# Patient Record
Sex: FEMALE
[De-identification: names, ages, dates, MRNs, and addresses within clinical notes are randomized per-mention and may not be internally consistent; named-entity substitution may affect disease eponyms.]

---

## 2019-11-26 ENCOUNTER — LABORATORY RESULT (OUTPATIENT)
Age: 64
End: 2019-11-26

## 2019-11-26 ENCOUNTER — APPOINTMENT (OUTPATIENT)
Dept: PULMONOLOGY | Facility: CLINIC | Age: 64
End: 2019-11-26
Payer: COMMERCIAL

## 2019-11-26 VITALS
WEIGHT: 133 LBS | HEIGHT: 61 IN | RESPIRATION RATE: 16 BRPM | HEART RATE: 89 BPM | SYSTOLIC BLOOD PRESSURE: 108 MMHG | BODY MASS INDEX: 25.11 KG/M2 | DIASTOLIC BLOOD PRESSURE: 60 MMHG | OXYGEN SATURATION: 99 %

## 2019-11-26 PROCEDURE — 94727 GAS DIL/WSHOT DETER LNG VOL: CPT

## 2019-11-26 PROCEDURE — 94060 EVALUATION OF WHEEZING: CPT

## 2019-11-26 PROCEDURE — 99203 OFFICE O/P NEW LOW 30 MIN: CPT | Mod: 25

## 2019-11-26 PROCEDURE — 94729 DIFFUSING CAPACITY: CPT

## 2019-11-26 RX ORDER — SODIUM CHLORIDE 2.65%
2.65 AEROSOL, SPRAY (ML) NASAL TWICE DAILY
Qty: 1 | Refills: 2 | Status: ACTIVE | COMMUNITY
Start: 2019-11-26 | End: 1900-01-01

## 2019-11-26 NOTE — HISTORY OF PRESENT ILLNESS
[FreeTextEntry1] : 64 year old woman with chronic asthma that she notes has become worse.  She has had asthma for many years, as well as allergies and sinusitis.  She has had sinus drainage many years ago. She has sinus infections once or twice per year.  She is sensitive to irritants and pollution. She has never smoked.  She reports postnasal drip at night and chronic rhinitis.  She has been using Flovent inhaler, albuterol, fluticasone nasal spray. \par \par She has arthritis and has had carpal tunnel syndrome and tendonitis.\par \par PSH\par \par 1959: tonsillectomy\par \par 2013 carpal tunnel, trigger release\par 1996 partial hysterectomy\par \par 1991: sinus surgery. sinus drained. \par \par Family History:\par \par There is no family history of cancer, chronic lung disease.  There is history of heart disease\par \par \par PMH:\par \par HLD, improved\par \par sinus infections.  Receives antibiotics once or twice per year\par \par \par \par SH\par \par \par never smoker\par \par ETOH  occasional\par \par she has 1 dog\par \par no carpets\par \par mold damage  underwent abatement.\par \par Allergy\par Sulfa, penicillin, iodine, Valium, NSAID\par \par seasonal allergy\par \par \par ROS\par \par some reflux symptoms, \par \par sleep disturbance, no daytime somnolence

## 2019-11-26 NOTE — PHYSICAL EXAM
[General Appearance - Well Developed] : well developed [General Appearance - Well Nourished] : well nourished [General Appearance - In No Acute Distress] : no acute distress [Normal Oropharynx] : normal oropharynx [Neck Appearance] : the appearance of the neck was normal [Neck Cervical Mass (___cm)] : no neck mass was observed [Heart Rate And Rhythm] : heart rate and rhythm were normal [Jugular Venous Distention Increased] : there was no jugular-venous distention [Murmurs] : no murmurs present [Heart Sounds] : normal S1 and S2 [Arterial Pulses Normal] : the arterial pulses were normal [Respiration, Rhythm And Depth] : normal respiratory rhythm and effort [Exaggerated Use Of Accessory Muscles For Inspiration] : no accessory muscle use [Bowel Sounds] : normal bowel sounds [Abdomen Soft] : soft [Abdomen Tenderness] : non-tender [] : no hepato-splenomegaly [Nail Clubbing] : no clubbing of the fingernails [Oriented To Time, Place, And Person] : oriented to person, place, and time [Mood] : the mood was normal [Motor Exam] : the motor exam was normal [Low Lying Soft Palate] : no low lying soft palate [Elongated Uvula] : no elongated uvula [Enlarged Base of the Tongue] : no enlargement of the base of the tongue [FreeTextEntry1] : moderate coarseness, mid zone, no wheeze [FreeTextEntry2] : no edema

## 2019-11-26 NOTE — REVIEW OF SYSTEMS
[As Noted in HPI] : as noted in HPI [Nasal Congestion] : nasal congestion [Sinus Problems] : sinus problems [Cough] : cough [Sputum] : sputum  [Dyspnea] : dyspnea [Chest Tightness] : chest tightness [Wheezing] : wheezing [Hay Fever] : hay fever [Heartburn] : heartburn [Fever] : no fever [Chills] : no chills [Hypertension] : no ~T hypertension [Dysrhythmia] : no dysrhythmia [Reflux] : no reflux

## 2020-01-07 ENCOUNTER — RX RENEWAL (OUTPATIENT)
Age: 65
End: 2020-01-07

## 2020-01-21 ENCOUNTER — APPOINTMENT (OUTPATIENT)
Dept: PULMONOLOGY | Facility: CLINIC | Age: 65
End: 2020-01-21

## 2020-03-01 LAB
25(OH)D3 SERPL-MCNC: 30.3 NG/ML
A ALTERNATA IGE QN: 1.33 KUA/L
A FUMIGATUS IGE QN: 0.11 KUA/L
ALBUMIN SERPL ELPH-MCNC: 4.6 G/DL
ALP BLD-CCNC: 81 U/L
ALT SERPL-CCNC: 13 U/L
ANION GAP SERPL CALC-SCNC: 13 MMOL/L
AST SERPL-CCNC: 19 U/L
BASOPHILS # BLD AUTO: 0.03 K/UL
BASOPHILS NFR BLD AUTO: 0.4 %
BERMUDA GRASS IGE QN: <0.1 KUA/L
BILIRUB SERPL-MCNC: 0.2 MG/DL
BOXELDER IGE QN: <0.1 KUA/L
BUN SERPL-MCNC: 20 MG/DL
C HERBARUM IGE QN: <0.1 KUA/L
CALCIUM SERPL-MCNC: 10.3 MG/DL
CALIF WALNUT IGE QN: <0.1 KUA/L
CAT DANDER IGE QN: 0.44 KUA/L
CHLORIDE SERPL-SCNC: 103 MMOL/L
CHOLEST SERPL-MCNC: 214 MG/DL
CHOLEST/HDLC SERPL: 2.8 RATIO
CMN PIGWEED IGE QN: <0.1 KUA/L
CO2 SERPL-SCNC: 26 MMOL/L
COMMON RAGWEED IGE QN: 0.41 KUA/L
COTTONWOOD IGE QN: <0.1 KUA/L
CREAT SERPL-MCNC: 0.67 MG/DL
D FARINAE IGE QN: <0.1 KUA/L
D PTERONYSS IGE QN: <0.1 KUA/L
DEPRECATED A ALTERNATA IGE RAST QL: 2
DEPRECATED A FUMIGATUS IGE RAST QL: NORMAL
DEPRECATED BERMUDA GRASS IGE RAST QL: 0
DEPRECATED BOXELDER IGE RAST QL: 0
DEPRECATED C HERBARUM IGE RAST QL: 0
DEPRECATED CAT DANDER IGE RAST QL: 1
DEPRECATED COMMON PIGWEED IGE RAST QL: 0
DEPRECATED COMMON RAGWEED IGE RAST QL: 1
DEPRECATED COTTONWOOD IGE RAST QL: 0
DEPRECATED D FARINAE IGE RAST QL: 0
DEPRECATED D PTERONYSS IGE RAST QL: 0
DEPRECATED DOG DANDER IGE RAST QL: NORMAL
DEPRECATED GOOSEFOOT IGE RAST QL: 0
DEPRECATED LONDON PLANE IGE RAST QL: 0
DEPRECATED MUGWORT IGE RAST QL: 1
DEPRECATED P NOTATUM IGE RAST QL: 0
DEPRECATED RED CEDAR IGE RAST QL: 0
DEPRECATED ROACH IGE RAST QL: 0
DEPRECATED SHEEP SORREL IGE RAST QL: 0
DEPRECATED SILVER BIRCH IGE RAST QL: 0
DEPRECATED TIMOTHY IGE RAST QL: NORMAL
DEPRECATED WHITE ASH IGE RAST QL: 0
DEPRECATED WHITE OAK IGE RAST QL: NORMAL
DOG DANDER IGE QN: 0.11 KUA/L
EOSINOPHIL # BLD AUTO: 0.04 K/UL
EOSINOPHIL NFR BLD AUTO: 0.6 %
FERRITIN SERPL-MCNC: 137 NG/ML
GLUCOSE SERPL-MCNC: 91 MG/DL
GOOSEFOOT IGE QN: <0.1 KUA/L
HCT VFR BLD CALC: 38.3 %
HDLC SERPL-MCNC: 76 MG/DL
HGB BLD-MCNC: 12.2 G/DL
IMM GRANULOCYTES NFR BLD AUTO: 0.3 %
IRON SATN MFR SERPL: 28 %
IRON SERPL-MCNC: 85 UG/DL
LDLC SERPL CALC-MCNC: 124 MG/DL
LONDON PLANE IGE QN: <0.1 KUA/L
LYMPHOCYTES # BLD AUTO: 1.9 K/UL
LYMPHOCYTES NFR BLD AUTO: 28.5 %
MAN DIFF?: NORMAL
MCHC RBC-ENTMCNC: 29.9 PG
MCHC RBC-ENTMCNC: 31.9 GM/DL
MCV RBC AUTO: 93.9 FL
MONOCYTES # BLD AUTO: 0.61 K/UL
MONOCYTES NFR BLD AUTO: 9.1 %
MUGWORT IGE QN: 0.45 KUA/L
MULBERRY (T70) CLASS: 0
MULBERRY (T70) CONC: <0.1 KUA/L
NEUTROPHILS # BLD AUTO: 4.07 K/UL
NEUTROPHILS NFR BLD AUTO: 61.1 %
P NOTATUM IGE QN: <0.1 KUA/L
PLATELET # BLD AUTO: 295 K/UL
POTASSIUM SERPL-SCNC: 4.4 MMOL/L
PROT SERPL-MCNC: 6.7 G/DL
RBC # BLD: 4.08 M/UL
RBC # FLD: 12.3 %
RED CEDAR IGE QN: <0.1 KUA/L
ROACH IGE QN: <0.1 KUA/L
SHEEP SORREL IGE QN: <0.1 KUA/L
SILVER BIRCH IGE QN: <0.1 KUA/L
SODIUM SERPL-SCNC: 142 MMOL/L
TIBC SERPL-MCNC: 305 UG/DL
TIMOTHY IGE QN: 0.2 KUA/L
TOTAL IGE SMQN RAST: 39 KU/L
TREE ALLERG MIX1 IGE QL: 0
TRIGL SERPL-MCNC: 72 MG/DL
TSH SERPL-ACNC: 1.88 UIU/ML
UIBC SERPL-MCNC: 220 UG/DL
VIT B12 SERPL-MCNC: 565 PG/ML
WBC # FLD AUTO: 6.67 K/UL
WHITE ASH IGE QN: <0.1 KUA/L
WHITE ELM IGE QN: 0
WHITE ELM IGE QN: <0.1 KUA/L
WHITE OAK IGE QN: 0.19 KUA/L

## 2020-03-17 ENCOUNTER — APPOINTMENT (OUTPATIENT)
Dept: PULMONOLOGY | Facility: CLINIC | Age: 65
End: 2020-03-17
Payer: MEDICARE

## 2020-03-17 VITALS
SYSTOLIC BLOOD PRESSURE: 122 MMHG | TEMPERATURE: 97.9 F | DIASTOLIC BLOOD PRESSURE: 80 MMHG | WEIGHT: 134 LBS | OXYGEN SATURATION: 97 % | HEART RATE: 80 BPM | BODY MASS INDEX: 25.32 KG/M2 | RESPIRATION RATE: 17 BRPM

## 2020-03-17 PROCEDURE — 99214 OFFICE O/P EST MOD 30 MIN: CPT

## 2020-03-17 NOTE — HISTORY OF PRESENT ILLNESS
[TextBox_4] : 64 year old woman with chronic asthma that she notes has become worse. She has had asthma for many years, as well as allergies and sinusitis. She has had sinus drainage many years ago. She has sinus infections once or twice per year. She is sensitive to irritants and pollution. She has never smoked. She reports postnasal drip at night and chronic rhinitis. She has been using Flovent inhaler, albuterol, fluticasone nasal spray. \par \par She states she still has mucus in the nose that is worse at night. She is using saline and steroid nasal spray. Asthma has improved on Symbicort which she stopped 1 month ago.\par \par \par She has arthritis and has had carpal tunnel syndrome and tendonitis.\par \par PSH\par \par 1959: tonsillectomy\par \par 2013 carpal tunnel, trigger release\par 1996 partial hysterectomy\par \par 1991: sinus surgery. sinus drained. \par \par Family History:\par \par There is no family history of cancer, chronic lung disease. There is history of heart disease\par \par \par PMH:\par \par HLD, improved\par \par sinus infections. Receives antibiotics once or twice per year\par \par \par \par SH\par \par \par never smoker\par \par ETOH occasional\par \par she has 1 dog\par \par no carpets\par \par mold damage underwent abatement.\par \par Allergy\par Sulfa, penicillin, iodine, Valium, NSAID\par \par seasonal allergy\par \par \par ROS\par \par some reflux symptoms, \par \par sleep disturbance, no daytime somnolence \par

## 2020-03-17 NOTE — DISCUSSION/SUMMARY
[FreeTextEntry1] : chronic asthma improved \par \par seasonal allergy\par \par PLAN\par \par will likely need to restart Symbicort for allergy season\par \par continue Flonase and nasal saline, albuterol as needed\par \par mucinex as needed\par \par continue Allegra\par \par She has had the influenza vaccine this season. \par \par Bao Bermudez MD [facility] \par

## 2020-03-17 NOTE — PHYSICAL EXAM
[No Acute Distress] : no acute distress [Normal Oropharynx] : normal oropharynx [I] : Mallampati Class: I [Normal Appearance] : normal appearance [No Neck Mass] : no neck mass [No JVD] : no jvd [Normal Rate/Rhythm] : normal rate/rhythm [Normal S1, S2] : normal s1, s2 [No Murmurs] : no murmurs [No Resp Distress] : no resp distress [Clear to Auscultation Bilaterally] : clear to auscultation bilaterally [No Abnormalities] : no abnormalities [Benign] : benign [No HSM] : no hsm [No Clubbing] : no clubbing [No Edema] : no edema [No Focal Deficits] : no focal deficits [Oriented x3] : oriented x3 [Normal Affect] : normal affect

## 2020-06-30 ENCOUNTER — APPOINTMENT (OUTPATIENT)
Dept: PULMONOLOGY | Facility: CLINIC | Age: 65
End: 2020-06-30
Payer: MEDICARE

## 2020-06-30 VITALS
RESPIRATION RATE: 17 BRPM | TEMPERATURE: 98.7 F | HEART RATE: 80 BPM | SYSTOLIC BLOOD PRESSURE: 120 MMHG | OXYGEN SATURATION: 98 % | BODY MASS INDEX: 25.7 KG/M2 | DIASTOLIC BLOOD PRESSURE: 80 MMHG | WEIGHT: 136 LBS

## 2020-06-30 PROCEDURE — 99214 OFFICE O/P EST MOD 30 MIN: CPT

## 2020-06-30 RX ORDER — BUDESONIDE 32 UG/1
32 SPRAY, METERED NASAL DAILY
Qty: 3 | Refills: 2 | Status: DISCONTINUED | COMMUNITY
Start: 2020-03-17 | End: 2020-06-30

## 2020-06-30 NOTE — REVIEW OF SYSTEMS
[Nasal Congestion] : nasal congestion [Cough] : cough [Hay Fever] : hay fever [GERD] : gerd [Fever] : no fever [Sputum] : no sputum [Dyspnea] : no dyspnea [Edema] : no edema [TextBox_69] : better with avoidance

## 2020-06-30 NOTE — PHYSICAL EXAM
[No Acute Distress] : no acute distress [Normal Appearance] : normal appearance [No Neck Mass] : no neck mass [No JVD] : no jvd [Normal Rate/Rhythm] : normal rate/rhythm [Normal S1, S2] : normal s1, s2 [No Murmurs] : no murmurs [No Resp Distress] : no resp distress [Clear to Auscultation Bilaterally] : clear to auscultation bilaterally [No Abnormalities] : no abnormalities [Benign] : benign [No HSM] : no hsm [No Clubbing] : no clubbing [No Edema] : no edema [No Focal Deficits] : no focal deficits [Oriented x3] : oriented x3 [Normal Affect] : normal affect

## 2020-06-30 NOTE — HISTORY OF PRESENT ILLNESS
[TextBox_4] : 64 year old woman with chronic asthma. She has had asthma for many years, as well as allergies and sinusitis. She has had sinus drainage many years ago. She has sinus infections once or twice per year. She is sensitive to irritants and pollution. She has never smoked. She has had postnasal drip at night and chronic rhinitis. \par \par Recently, she has been using an herbal supplement that helps her sinus congestion.\par \par \par \par Asthma has been in good control.\par \par She is no longer using inhaled steroid. She has been using only albuterol.  She is currently not using fluticasone nasal spray. \par \par \par She has arthritis and has had carpal tunnel syndrome and tendonitis.\par \par PSH\par \par 1959: tonsillectomy\par \par 2013 carpal tunnel, trigger release\par 1996 partial hysterectomy\par \par 1991: sinus surgery. sinus drained. \par \par Family History:\par \par There is no family history of cancer, chronic lung disease. There is history of heart disease\par \par \par PMH:\par \par HLD, improved\par \par sinus infections. Receives antibiotics once or twice per year\par \par \par \par SH\par \par \par never smoker\par \par ETOH occasional\par \par she has 1 dog\par \par no carpets\par \par mold damage underwent abatement.\par \par Allergy\par Sulfa, penicillin, iodine, Valium, NSAID\par \par seasonal allergy\par \par \par ROS\par \par some reflux symptoms, \par \par sleep disturbance, no daytime somnolence \par \par SLEEP\par No snoring, witnessed apnea, excessive daytime sleepiness, morning headache \par

## 2020-06-30 NOTE — DISCUSSION/SUMMARY
[FreeTextEntry1] : chronic asthma improved now using only albuterol rescue and herbal remedy.  No dyspnea\par \par seasonal allergy better controlled.\par \par PLAN\par \par Hold ICS/LABA\par \par continue to use albuterol as needed\par \par mucinex as needed\par \par continue Allegra only as needed\par \par She is very happy using herbal supplement that seems to clear sinus symptoms\par \par She has had the influenza vaccine this season. \par \par SLEEP\par No snoring, witnessed apnea, excessive daytime sleepiness, morning headache \par \par Bao Bermudez MD [facility] \par

## 2020-07-09 ENCOUNTER — RESULT REVIEW (OUTPATIENT)
Age: 65
End: 2020-07-09

## 2020-08-07 LAB
25(OH)D3 SERPL-MCNC: 27.3 NG/ML
ALBUMIN SERPL ELPH-MCNC: 5 G/DL
ALP BLD-CCNC: 86 U/L
ALT SERPL-CCNC: 12 U/L
ANION GAP SERPL CALC-SCNC: 15 MMOL/L
AST SERPL-CCNC: 17 U/L
BASOPHILS # BLD AUTO: 0.02 K/UL
BASOPHILS NFR BLD AUTO: 0.3 %
BILIRUB SERPL-MCNC: 0.5 MG/DL
BUN SERPL-MCNC: 16 MG/DL
CALCIUM SERPL-MCNC: 9.8 MG/DL
CHLORIDE SERPL-SCNC: 102 MMOL/L
CHOLEST SERPL-MCNC: 231 MG/DL
CHOLEST/HDLC SERPL: 3.8 RATIO
CO2 SERPL-SCNC: 23 MMOL/L
CREAT SERPL-MCNC: 0.73 MG/DL
EOSINOPHIL # BLD AUTO: 0.08 K/UL
EOSINOPHIL NFR BLD AUTO: 1.3 %
GLUCOSE SERPL-MCNC: 95 MG/DL
HCT VFR BLD CALC: 40.9 %
HDLC SERPL-MCNC: 61 MG/DL
HGB BLD-MCNC: 12.5 G/DL
IMM GRANULOCYTES NFR BLD AUTO: 0.7 %
LDLC SERPL CALC-MCNC: 148 MG/DL
LYMPHOCYTES # BLD AUTO: 1.16 K/UL
LYMPHOCYTES NFR BLD AUTO: 19.3 %
MAN DIFF?: NORMAL
MCHC RBC-ENTMCNC: 29.9 PG
MCHC RBC-ENTMCNC: 30.6 GM/DL
MCV RBC AUTO: 97.8 FL
MONOCYTES # BLD AUTO: 0.58 K/UL
MONOCYTES NFR BLD AUTO: 9.7 %
NEUTROPHILS # BLD AUTO: 4.13 K/UL
NEUTROPHILS NFR BLD AUTO: 68.7 %
PLATELET # BLD AUTO: 288 K/UL
POTASSIUM SERPL-SCNC: 4 MMOL/L
PROT SERPL-MCNC: 7.2 G/DL
RBC # BLD: 4.18 M/UL
RBC # FLD: 12.8 %
SARS-COV-2 IGG SERPL IA-ACNC: 0.01 INDEX
SARS-COV-2 IGG SERPL QL IA: NEGATIVE
SODIUM SERPL-SCNC: 140 MMOL/L
TRIGL SERPL-MCNC: 109 MG/DL
WBC # FLD AUTO: 6.01 K/UL

## 2020-12-01 ENCOUNTER — APPOINTMENT (OUTPATIENT)
Dept: PULMONOLOGY | Facility: CLINIC | Age: 65
End: 2020-12-01
Payer: MEDICARE

## 2020-12-01 VITALS
SYSTOLIC BLOOD PRESSURE: 122 MMHG | WEIGHT: 128 LBS | HEART RATE: 85 BPM | OXYGEN SATURATION: 96 % | BODY MASS INDEX: 24.19 KG/M2 | TEMPERATURE: 97 F | RESPIRATION RATE: 17 BRPM | DIASTOLIC BLOOD PRESSURE: 80 MMHG

## 2020-12-01 PROCEDURE — 99214 OFFICE O/P EST MOD 30 MIN: CPT

## 2020-12-01 RX ORDER — AZELASTINE HYDROCHLORIDE 137 UG/1
137 SPRAY, METERED NASAL TWICE DAILY
Qty: 1 | Refills: 0 | Status: ACTIVE | COMMUNITY
Start: 2020-12-01 | End: 1900-01-01

## 2020-12-01 RX ORDER — BUDESONIDE AND FORMOTEROL FUMARATE DIHYDRATE 160; 4.5 UG/1; UG/1
160-4.5 AEROSOL RESPIRATORY (INHALATION) TWICE DAILY
Qty: 3 | Refills: 1 | Status: DISCONTINUED | COMMUNITY
Start: 2019-11-26 | End: 2020-12-01

## 2020-12-01 RX ORDER — FLUTICASONE PROPIONATE 50 UG/1
50 SPRAY, METERED NASAL
Qty: 1 | Refills: 2 | Status: ACTIVE | COMMUNITY
Start: 2019-11-26

## 2020-12-01 NOTE — HISTORY OF PRESENT ILLNESS
[TextBox_4] : 65 year old woman with chronic asthma. She has had asthma for many years, as well as allergies and sinusitis. She has had sinus drainage many years ago. She has sinus infections once or twice per year. She is sensitive to irritants and pollution. She has never smoked. She has had postnasal drip at night and chronic rhinitis. \par \par She continues to use saline nasal wash.  She has run out of fluticasone nasal spray. \par \par Recently, she has been using an herbal supplement that helps her sinus congestion.\par \par Asthma has been in good control. \par She is no longer using inhaled steroid. She has been using only albuterol.  \par \par \par She has arthritis and has had carpal tunnel syndrome and tendonitis.\par \par She is on vegetarian diet and has lost weight.\par \par PSH\par \par 1959: tonsillectomy\par \par 2013 carpal tunnel, trigger release\par 1996 partial hysterectomy\par \par 1991: sinus surgery. sinus drained. \par \par Family History:\par \par There is no family history of cancer, chronic lung disease. There is history of heart disease\par \par \par PMH:\par \par HLD, improved\par \par sinus infections. Receives antibiotics once or twice per year\par \par \par \par SH\par \par \par never smoker\par \par ETOH occasional\par \par she has 1 dog\par \par no carpets\par \par mold damage underwent abatement.\par \par Allergy\par Sulfa, penicillin, iodine, Valium, NSAID\par \par seasonal allergy\par \par \par ROS\par \par some reflux symptoms, \par \par sleep disturbance, no daytime somnolence \par \par SLEEP\par No snoring, witnessed apnea, excessive daytime sleepiness, morning headache \par  [TextBox_19] : SLEEP\par No snoring, witnessed apnea, excessive daytime sleepiness, morning headache

## 2020-12-01 NOTE — DISCUSSION/SUMMARY
[FreeTextEntry1] : chronic asthma improved now using only albuterol rescue and herbal remedy.  No dyspnea\par \par seasonal allergy better controlled.\par \par rhinitis\par \par PLAN\par \par use albuterol MDI as needed\par \par restart Flonase\par trial azelastine\par \par mucinex as needed\par \par refer to ENT\par \par She has had the influenza vaccine this season. \par \par \par Bao Bermudez MD [facility] \par

## 2021-04-08 ENCOUNTER — APPOINTMENT (OUTPATIENT)
Dept: OTOLARYNGOLOGY | Facility: CLINIC | Age: 66
End: 2021-04-08

## 2021-06-02 ENCOUNTER — APPOINTMENT (OUTPATIENT)
Dept: PULMONOLOGY | Facility: CLINIC | Age: 66
End: 2021-06-02
Payer: MEDICARE

## 2021-06-02 VITALS
WEIGHT: 132 LBS | HEART RATE: 70 BPM | OXYGEN SATURATION: 98 % | DIASTOLIC BLOOD PRESSURE: 80 MMHG | TEMPERATURE: 98 F | HEIGHT: 61 IN | BODY MASS INDEX: 24.92 KG/M2 | SYSTOLIC BLOOD PRESSURE: 128 MMHG

## 2021-06-02 DIAGNOSIS — J30.9 ALLERGIC RHINITIS, UNSPECIFIED: ICD-10-CM

## 2021-06-02 PROCEDURE — 99213 OFFICE O/P EST LOW 20 MIN: CPT

## 2021-06-02 RX ORDER — BUDESONIDE AND FORMOTEROL FUMARATE DIHYDRATE 160; 4.5 UG/1; UG/1
160-4.5 AEROSOL RESPIRATORY (INHALATION) TWICE DAILY
Qty: 3 | Refills: 1 | Status: ACTIVE | COMMUNITY
Start: 2021-06-02 | End: 1900-01-01

## 2021-06-02 RX ORDER — FEXOFENADINE HYDROCHLORIDE 180 MG/1
180 TABLET ORAL
Qty: 90 | Refills: 2 | Status: DISCONTINUED | OUTPATIENT
Start: 2021-06-02 | End: 2021-06-02

## 2021-06-02 RX ORDER — ALBUTEROL SULFATE 90 UG/1
108 (90 BASE) AEROSOL, METERED RESPIRATORY (INHALATION)
Qty: 2 | Refills: 3 | Status: ACTIVE | COMMUNITY
Start: 2019-11-26 | End: 1900-01-01

## 2021-06-02 RX ORDER — FEXOFENADINE HYDROCHLORIDE 180 MG/1
180 TABLET, FILM COATED ORAL
Qty: 30 | Refills: 2 | Status: ACTIVE | OUTPATIENT
Start: 2021-06-02

## 2021-06-02 RX ORDER — FEXOFENADINE HYDROCHLORIDE 180 MG/1
180 TABLET ORAL DAILY
Qty: 30 | Refills: 2 | Status: DISCONTINUED | COMMUNITY
Start: 2019-11-26 | End: 2021-06-02

## 2021-06-03 PROBLEM — J30.9 ALLERGIC RHINITIS: Status: ACTIVE | Noted: 2020-12-01

## 2021-06-03 NOTE — PHYSICAL EXAM
[No Acute Distress] : no acute distress [Normal Appearance] : normal appearance [No Neck Mass] : no neck mass [No JVD] : no jvd [Normal Rate/Rhythm] : normal rate/rhythm [Normal S1, S2] : normal s1, s2 [No Murmurs] : no murmurs [No Resp Distress] : no resp distress [Clear to Auscultation Bilaterally] : clear to auscultation bilaterally [No Abnormalities] : no abnormalities [Benign] : benign [No HSM] : no hsm [No Clubbing] : no clubbing [No Edema] : no edema [No Focal Deficits] : no focal deficits [Normal Affect] : normal affect [Oriented x3] : oriented x3 [Well Nourished] : well nourished [Well Developed] : well developed

## 2021-06-03 NOTE — DISCUSSION/SUMMARY
[FreeTextEntry1] : chronic asthma now using only albuterol rescue and herbal remedy.  No dyspnea\par \par seasonal allergy\par \par Now with increased symptoms during allergy season\par \par She has not been on controller medications for asthma\par \par rhinitis\par \par Postnasal drip and mucus in back of throat.\par \par PLAN\par \par \par restart on Symbicort\par \par use albuterol MDI as needed\par \par restart Flonase 2 sprays at night\par \par Mucinex as needed\par \par trial of fexofenadine 180 mg per day\par \par Pneumonia vaccine to be offered next visit\par \par She has had the influenza vaccine this season. \par \par \par Bao Bermudez MD [facility] \par

## 2021-06-03 NOTE — HISTORY OF PRESENT ILLNESS
[TextBox_4] : 65 year old woman with chronic asthma. She has had asthma for many years, as well as allergies and sinusitis. She has had sinus drainage many years ago. She has sinus infections once or twice per year. She is sensitive to irritants and pollution. She has never smoked. She has had postnasal drip at night and chronic rhinitis. \par \par She continues to use saline nasal wash.  She has run out of fluticasone nasal spray. \par \par Recently, she has been using an herbal supplement that helps her sinus congestion.\par  \par She is no longer using inhaled steroid. She has been using only albuterol.  \par \par \par She has arthritis and has had carpal tunnel syndrome and tendonitis.\par \par She is on vegetarian diet and has lost weight.\par \par Today she reports that her status is worse.\par \par She reports having thick mucus in back of throat.  \par \par She has "sore lungs"  She uses albuterol only.\par \par She does use Flonase and loratadine. \par \par PSH\par \par 1959: tonsillectomy\par \par 2013 carpal tunnel, trigger release\par 1996 partial hysterectomy\par \par 1991: sinus surgery. sinus drained. \par \par Family History:\par \par There is no family history of cancer, chronic lung disease. There is history of heart disease\par \par \par PMH:\par \par HLD, improved\par \par sinus infections. Receives antibiotics once or twice per year\par \par \par \par SH\par \par \par never smoker\par \par ETOH occasional\par \par she has 1 dog\par \par no carpets\par \par mold damage underwent abatement.\par \par Allergy\par Sulfa, penicillin, iodine, Valium, NSAID\par \par seasonal allergy\par \par \par ROS\par \par some reflux symptoms, \par \par sleep disturbance, no daytime somnolence \par \par SLEEP\par No snoring, witnessed apnea, excessive daytime sleepiness, morning headache \par  [TextBox_19] : SLEEP\par No snoring, witnessed apnea, excessive daytime sleepiness, morning headache

## 2021-06-03 NOTE — REVIEW OF SYSTEMS
[Fever] : no fever [Nasal Congestion] : nasal congestion [Cough] : cough [Sputum] : no sputum [Dyspnea] : no dyspnea [Edema] : no edema [Hay Fever] : hay fever [GERD] : gerd [TextBox_69] : better with avoidance

## 2021-06-06 ENCOUNTER — RX RENEWAL (OUTPATIENT)
Age: 66
End: 2021-06-06

## 2021-06-06 RX ORDER — FLUTICASONE PROPIONATE 50 UG/1
50 SPRAY, METERED NASAL
Qty: 1 | Refills: 2 | Status: ACTIVE | COMMUNITY
Start: 2021-06-06 | End: 1900-01-01

## 2021-06-30 ENCOUNTER — APPOINTMENT (OUTPATIENT)
Dept: PULMONOLOGY | Facility: CLINIC | Age: 66
End: 2021-06-30

## 2022-06-13 ENCOUNTER — RX RENEWAL (OUTPATIENT)
Age: 67
End: 2022-06-13

## 2022-06-13 RX ORDER — EPINEPHRINE 0.3 MG/.3ML
0.3 INJECTION INTRAMUSCULAR
Qty: 2 | Refills: 2 | Status: ACTIVE | COMMUNITY
Start: 2021-06-02

## 2022-10-11 ENCOUNTER — NON-APPOINTMENT (OUTPATIENT)
Age: 67
End: 2022-10-11

## 2023-03-27 ENCOUNTER — APPOINTMENT (OUTPATIENT)
Dept: PULMONOLOGY | Facility: CLINIC | Age: 68
End: 2023-03-27
Payer: MEDICARE

## 2023-03-27 VITALS
BODY MASS INDEX: 24.75 KG/M2 | SYSTOLIC BLOOD PRESSURE: 118 MMHG | TEMPERATURE: 98.6 F | WEIGHT: 131 LBS | DIASTOLIC BLOOD PRESSURE: 70 MMHG | OXYGEN SATURATION: 98 % | HEART RATE: 68 BPM

## 2023-03-27 DIAGNOSIS — J45.909 UNSPECIFIED ASTHMA, UNCOMPLICATED: ICD-10-CM

## 2023-03-27 DIAGNOSIS — Z00.00 ENCOUNTER FOR GENERAL ADULT MEDICAL EXAMINATION W/OUT ABNORMAL FINDINGS: ICD-10-CM

## 2023-03-27 DIAGNOSIS — Z23 ENCOUNTER FOR IMMUNIZATION: ICD-10-CM

## 2023-03-27 PROCEDURE — 99214 OFFICE O/P EST MOD 30 MIN: CPT

## 2023-03-27 NOTE — DISCUSSION/SUMMARY
[FreeTextEntry1] : chronic asthma now using only albuterol rescue and herbal remedy.  No dyspnea\par \par seasonal allergy, taking loratadine and fluticasone nasal spray\par She has been breathing well\par \par She has not been on controller medications for asthma for at least 1 year\par \par rhinitis persists\par \par PLAN\par \par She does not need ICS LABA\par \par use albuterol MDI as needed\par \par \par Mucinex as needed\par \par uses loratadine\par \par Pneumonia vaccine obtained Prevnar 20\par \par She has had the influenza vaccine this season. \par \par Total time spent : 30 minutes\par Including:\par Preparation prior to visit - Reviewing prior record, results of tests and Consultation Reports as applicable\par Conducting an appropriate H & P during today's encounter\par Appropriate orders for tests, medications and procedures, as applicable\par Counseling patient \par Note completion \par \par \par Bao Bermudez MD [facility] \par

## 2023-03-27 NOTE — HISTORY OF PRESENT ILLNESS
[Never] : never [TextBox_4] : 67 year old woman with chronic asthma. She has had asthma for many years, as well as allergies and sinusitis. She has had sinus drainage many years ago. She has sinus infections once or twice per year. She is sensitive to irritants and pollution. She has never smoked. She has had postnasal drip at night and chronic rhinitis. \par \par She continues to use saline nasal wash.  She has run out of fluticasone nasal spray. \par \par Recently, she has been using an herbal supplement that helps her sinus congestion.\par  \par She is no longer using inhaled steroid. She has been using only albuterol.  \par \par \par She has arthritis and has had carpal tunnel syndrome and tendonitis.\par \par She is on vegetarian diet and has lost weight.\par \par Today she reports that her status is worse.\par \par She reports having thick mucus in back of throat.  \par \par She has "sore lungs"  She uses albuterol only.\par \par She does use Flonase and loratadine. \par \par \par \par PSH\par \par 1959: tonsillectomy\par \par 2013 carpal tunnel, trigger release\par 1996 partial hysterectomy\par \par 1991: sinus surgery. sinus drained. \par \par Family History:\par \par There is no family history of cancer, chronic lung disease. There is history of heart disease\par \par \par PMH:\par \par HLD, improved\par \par sinus infections. Receives antibiotics once or twice per year\par \par \par \par SH\par \par \par never smoker\par \par ETOH occasional\par \par she has 1 dog\par \par no carpets\par \par mold damage underwent abatement.\par \par Allergy\par Sulfa, penicillin, iodine, Valium, NSAID\par \par seasonal allergy\par \par \par ROS\par \par some reflux symptoms, \par \par sleep disturbance, no daytime somnolence \par \par SLEEP\par No snoring, witnessed apnea, excessive daytime sleepiness, morning headache \par  [TextBox_19] : SLEEP\par No snoring, witnessed apnea, excessive daytime sleepiness, morning headache

## 2023-03-27 NOTE — PHYSICAL EXAM
[No Acute Distress] : no acute distress [Well Nourished] : well nourished [Well Developed] : well developed [Normal Appearance] : normal appearance [No Neck Mass] : no neck mass [No JVD] : no jvd [Normal Rate/Rhythm] : normal rate/rhythm [Normal S1, S2] : normal s1, s2 [No Murmurs] : no murmurs [No Resp Distress] : no resp distress [Clear to Auscultation Bilaterally] : clear to auscultation bilaterally [No Abnormalities] : no abnormalities [Benign] : benign [No HSM] : no hsm [No Clubbing] : no clubbing [No Edema] : no edema [No Focal Deficits] : no focal deficits [Oriented x3] : oriented x3 [Normal Affect] : normal affect

## 2023-05-19 DIAGNOSIS — T78.40XA ALLERGY, UNSPECIFIED, INITIAL ENCOUNTER: ICD-10-CM

## 2023-05-19 RX ORDER — EPINEPHRINE 0.3 MG/.3ML
0.3 INJECTION INTRAMUSCULAR
Qty: 1 | Refills: 2 | Status: ACTIVE | COMMUNITY
Start: 2023-05-19 | End: 1900-01-01

## 2023-05-25 ENCOUNTER — NON-APPOINTMENT (OUTPATIENT)
Age: 68
End: 2023-05-25

## 2024-05-14 ENCOUNTER — NON-APPOINTMENT (OUTPATIENT)
Age: 69
End: 2024-05-14

## 2024-05-21 ENCOUNTER — OUTPATIENT (OUTPATIENT)
Dept: OUTPATIENT SERVICES | Facility: HOSPITAL | Age: 69
LOS: 1 days | Discharge: HOME | End: 2024-05-21

## 2024-05-21 DIAGNOSIS — Z15.09 GENETIC SUSCEPTIBILITY TO OTHER MALIGNANT NEOPLASM: ICD-10-CM

## 2024-05-28 ENCOUNTER — APPOINTMENT (OUTPATIENT)
Dept: HEMATOLOGY ONCOLOGY | Facility: CLINIC | Age: 69
End: 2024-05-28

## 2024-05-28 NOTE — DISCUSSION/SUMMARY
[FreeTextEntry1] : The visit was provided via telehealth using real-time 2-way audio visual technology. The patient, Silvia Sandoval, was located at home, [address] [place] [address], at the time of the visit. The Genetic Counselor, Kassandra Luis, was located remotely in [place], [place]. The patient and the Genetic Counselor both participated in the telehealth encounter. Consent for telehealth services was given on 2024 by the patient, Silvia Sandoval.  REASON FOR CONSULT Silvia Sandoval is a 68-year-old female who was referred by Dr. Radha Chapa for cancer genetic counseling and risk assessment due to her family history of cancer.   RELEVANT MEDICAL HISTORY Ms. Sandoval is a healthy individual who has never had cancer. She has a family history of cancer, see below.  OTHER MEDICAL AND SURGICAL HISTORY: Medical: cervical dysplasia Level 4 at age 41, asthma, GERD (reportedly regulated currently), sinusitis, arthritis Surgical: hysterectomy (reportedly ovaries intact), multiple hand surgeries due to glide release and carpal tunnel, tonsillectomy, sinus surgery  PAST OB/GYN HISTORY: Obstetrical History:  Age at Menarche: 12 Perimenopause at age 39, Menopausal age 53 with LMP with hysterectomy at age 41 Age at First Live Birth: 33.5 Oral Contraceptive Use: Yes, age 16 for 1 month total Hormone Replacement Therapy: None  CANCER SCREENING HISTORY:   Breast:  -	Mammo: 2023; Results: reportedly normal with dense, cystic breast tissue; Frequency: yearly since age 35 -	Sono: None -	MRI: None -	Breast Biopsies: None GYN: -	Pelvic exam: 2024; Frequency: yearly -	Ms. Sandoval reports a personal history of cervical dysplasia Level 4 at age 41 with hysterectomy  -	Pap smear: Ms. Sandoval reports these were performed for a few years after her hysterectomy of the "walls" and reportedly normal -	TVUS: Yes, most recently 5 years ago reportedly due to microhematuria (now resolved) which was normal Colon: -	Colonoscopy: 2024; Results: reportedly 2 small non-cancerous polyps removed, diverticulosis, hemorrhoids; Frequency: 5 per Ms. Sandoval but she states she may proceed more frequently due to her father's history of colon polyps -	Total Polyps: 2 per Ms. Sandoval Skin:   -	FBSE: ; Frequency: yearly, scheduled 2024 -	Lesions biopsied/removed: None  SOCIAL HISTORY: -	Tobacco-product use: Never smoker -	Environmental exposure: [place], part of  registry  FAMILY HISTORY: Maternal ancestry was reported as [place] ([employer]) and paternal ancestry was reported as [place] and [place] ([place]ken[place] [employer]). A detailed family history of cancer was ascertained. Relevant diagnoses are detailed below and in the scanned pedigree.   To Ms. Sandoval's knowledge, no one in the family has had germline testing for cancer susceptibility.    	RISK ASSESSMENT: Ms. Sandoval's family history of breast cancer in her mother (dx 57), maternal aunt (dx late 60s), paternal aunt (mid 60s), and paternal first cousin (dx 70s) in the setting of [place] ancestry is suggestive of an inherited predisposition to breast cancer and related cancers. We also discussed that approximately 1 in 40 individuals with [place] ancestry carry a mutation in the BRCA1/2 genes. We discussed that although Ms. Sandoval meets the National Comprehensive Cancer Network (NCCN) criteria for genetic testing, she does not meet Medicare criteria for testing based on the current reported personal and family history of cancer. She understood she would have to pursue genetic testing using the laboratory's self-pay option of ~$249.   We recommended genetic testing using [facility] BRCANext Panel, a guidelines-based panel of genes associated with breast and gynecologic cancers, with the option to expand testing to include their ColoNext Panel, a panel of genes associated with colorectal polyps and colorectal cancer due to her father's reported history of 20+ precancerous colon polyps but discussed her personal reported history of 2 total benign polyps may be reassuring.   We discussed the risks, benefits and limitations, and implications of genetic testing. We also discussed the psychosocial implications of genetic testing. Possible test results were reviewed with Ms. Sandoval, along with associated medical management options. The Genetic Information Non-discrimination Act (BIJAL) was also reviewed.   Following our discussion, Ms. Sandoval decided to defer genetic testing. She would like to consider the potential implications of genetic testing further prior to pursuing genetic testing. Handout on BIJAL and the laboratory's informed consent form provided to Ms. Sandoval via email for her review. Ms. Sandoval was made aware that she may pursue testing at any time in the future and we remain available to her should she wish to make another appointment. It was explained that risk assessment is based upon medical and family history as provided and may change in the future should new information be obtained.    PLAN:  1.	Patient elected to defer genetic testing. She will contact us in the future if she wishes to proceed with genetic testing. 2.	Copy of [facility] informed consent and handout on BIJAL sent via email for Ms. Sandoval's review. 3.	Copy of medical release form sent via email for Ms. Sandoval to complete and return to allow discussion of her genetics information with her internist, Dr. Anu Velazquez. 4.	Ms. Sandoval was made aware if anything changes in her personal or family history, she should contact us to re-assess her risk and re-evaluate the role of genetic testing.    For any additional questions please call Cancer Genetics at [phone].    Kassandra Luis MS, [facility] Genetic Counselor, Cancer Genetics   CC:  Patient Dr. Radha Chapa

## 2024-07-22 ENCOUNTER — TRANSCRIPTION ENCOUNTER (OUTPATIENT)
Age: 69
End: 2024-07-22

## 2025-05-23 ENCOUNTER — TRANSCRIPTION ENCOUNTER (OUTPATIENT)
Age: 70
End: 2025-05-23